# Patient Record
Sex: MALE | HISPANIC OR LATINO | URBAN - METROPOLITAN AREA
[De-identification: names, ages, dates, MRNs, and addresses within clinical notes are randomized per-mention and may not be internally consistent; named-entity substitution may affect disease eponyms.]

---

## 2017-12-14 ENCOUNTER — OFFICE VISIT (OUTPATIENT)
Dept: RETAIL CLINIC | Facility: CLINIC | Age: 32
End: 2017-12-14

## 2017-12-14 VITALS
HEART RATE: 95 BPM | BODY MASS INDEX: 28.46 KG/M2 | TEMPERATURE: 98.2 F | RESPIRATION RATE: 16 BRPM | WEIGHT: 170.8 LBS | OXYGEN SATURATION: 97 % | HEIGHT: 65 IN

## 2017-12-14 DIAGNOSIS — J06.9 VIRAL UPPER RESPIRATORY TRACT INFECTION: Primary | ICD-10-CM

## 2017-12-14 PROCEDURE — 99213 OFFICE O/P EST LOW 20 MIN: CPT | Performed by: NURSE PRACTITIONER

## 2017-12-14 RX ORDER — FLUTICASONE PROPIONATE 50 MCG
2 SPRAY, SUSPENSION (ML) NASAL NIGHTLY
Qty: 1 BOTTLE | Refills: 0 | Status: SHIPPED | OUTPATIENT
Start: 2017-12-14 | End: 2018-01-13

## 2017-12-14 RX ORDER — AMOXICILLIN 500 MG/1
500 CAPSULE ORAL 2 TIMES DAILY
Qty: 14 CAPSULE | Refills: 0 | Status: SHIPPED | OUTPATIENT
Start: 2017-12-14 | End: 2017-12-21

## 2017-12-14 NOTE — PATIENT INSTRUCTIONS
Fluticasone nasal spray  ¿Qué es javed medicamento?  La FLUTICASONA es un corticosteroide. Javed medicamento se utiliza para tratar los síntomas de alergias, tales enrique estornudos, picazón o enrojecimiento en los ojos, picazón o goteo de la nariz, o nariz tapada.  Javed medicamento puede ser utilizado para otros usos; si tiene alguna pregunta consulte con jauregui proveedor de atención médica o con jauregui farmacéutico.  MARCAS COMUNES: Flonase, Flonase Allergy Relief, Flonase Sensimist, Veramyst  ¿Qué le hay informar a mi profesional de la hussain antes de juan javed medicamento?  Necesita saber si usted presenta alguno de los siguientes problemas o situaciones:  -infección, enrique tuberculosis, herpes o infección micótica  -cirugía o lesión nasal o sinusal reciente  -si está tomando corticosteroides por vía oral  -nell reacción alérgica o inusual a la fluticasona, a steroides, a otros medicamentos, alimentos, colorantes o conservantes  -si está embarazada o buscando quedar embarazada  -si está amamantando a un bebé  ¿Cómo hay utilizar javed medicamento?  Javed medicamento es para utilizar en la nariz. Siga las instrucciones de la etiqueta del medicamento o producto. Javed medicamento actúa mejor cuando se utiliza intervalos regulares. No utilice jauregui medicamento con nell frecuencia mayor a la indicada. Asegúrese de que esté utilizando jauregui aerosol nasal correctamente. Después de 6 meses de uso diario sin receta, hable con jauregui médico o jauregui profesional de la hussain antes de usarlo por más tiempo. Consulte a jauregui médico o jauregui profesional de la hussain si tiene preguntas.  Hable con jauregui pediatra para informarse acerca del uso de javed medicamento en niños. Puede requerir atención especial. Javed medicamento ha sido utilizado para niños tan mejores enrique 2 años de edad. Después de dos meses de uso diario sin receta en un jocelyn, hable con jauregui pediatra antes de usarlo por más tiempo.  Sobredosis: Póngase en contacto inmediatamente con un centro toxicológico o  nell nicci de urgencia si usted sarahy que haya tomado demasiado medicamento.  ATENCIÓN: Javed medicamento es solo para usted. No comparta javed medicamento con nadie.  ¿Qué sucede si me olvido de nell dosis?  Si olvida nell dosis, tómela en cuanto se acuerde. Si es yosi la hora de jauregui próxima dosis, utilice sólo preston dosis y continúe con jauregui horario habitual. No use dosis dobles o adicionales.  ¿Qué puede interactuar con javed medicamento?  -quetoconazol  -metirapona  -algunos medicamentos para la infección por VIH  -vacunas  Puede ser que esta lista no menciona todas las posibles interacciones. Informe a jauregui profesional de la hussain de todos los productos a base de hierbas, medicamentos de venta arlette o suplementos nutritivos que esté tomando. Si usted fuma, consume bebidas alcohólicas o si utiliza drogas ilegales, indíqueselo también a jauregui profesional de la hussain. Algunas sustancias pueden interactuar con jauregui medicamento.  ¿A qué hay estar atento al usar javed medicamento?  Visite a juaregui médico o a jauregui profesional de la hussain para chequear jauregui evolución periódicamente. Puede experimentar nell mejoría en algunos de artie síntomas 12 horas después de aleida comenzado a utilizarlo. Informe a jauregui médico o a jauregui profesional de la hussain si artie síntomas no mejoran después de usar el medicamento lurdes 3 semanas.  Comuníquese de inmediato con jauregui médico si está en contacto con personas con sarampión o varicela mientras esté tomando javed medicamento.  ¿Qué efectos secundarios puedo tener al utilizar javed medicamento?  Efectos secundarios que debe informar a jauregui médico o a jauregui profesional de la hussain tan pronto enrique sea posible:  -reacciones alergicas, tales enrique erupción cutánea, picazón o urticarias, hinchazón de la ta, labios o lengua  -cambios en la visión  -síntomas gripales  -manchas dimitrios o llagas dentro de la boca o de la nariz  Efectos secundarios que, por lo general, no requieren atención médica (debe informarlos a jauregui médico o a jauregui  profesional de la hussain si persisten o si son molestos):  -ardor o irritación dentro de la nariz o de la garganta  -tos  -dolor de dominick  -sangrado por la nariz  -sabor u olor desagradable  Puede ser que esta lista no menciona todos los posibles efectos secundarios. Comuníquese a jauregui médico por asesoramiento médico sobre los efectos secundarios. Usted puede informar los efectos secundarios a la FDA por teléfono al [phone]-[phone].  ¿Dónde hay guardar mi medicina?  Manténgala fuera del alcance de los niños.  Guárdela a temperatura ambiente, entre 15 y 30 grados C (59 y 86 grados F). Deseche todo el medicamento que no haya utilizado, después de la fecha de vencimiento.  ATENCIÓN: Karla folleto es un resumen. Puede ser que no cubra toda la posible información. Si usted tiene preguntas acerca de esta medicina, consulte con jauregui médico, jauregui farmacéutico o jauregui profesional de la hussain.     © 2017, [employer]. (2016-02-09 00:00:00)    Otitis media - Adultos  (Otitis Media, Adult)  La otitis media es la irritación, dolor e inflamación (hinchazón) en el espacio que se encuentra detrás del tímpano (oído medio). La causa puede ser nell alergia o nell infección. Generalmente aparece junto con un resfrío.  CUIDADOS EN EL HOGAR  · Talmo los medicamentos según las indicaciones. Finalice la prescripción completa, aunque se sienta mejor.  · Solo tome medicamentos de venta arlette o recetados para el dolor, malestar o fiebre, enrique le indique el médico.  · Concurra a las consultas de control con el médico, según las indicaciones.  SOLICITE AYUDA SI:  · Tiene otitis media sólo en un oído o sangra por la nariz, o ambas cosas.  · Advierte un bulto en el martinez.  · No mejora luego de 3-5 días.  · Empeora en lugar de mejorar.  SOLICITE AYUDA DE INMEDIATO SI:   · Siente un dolor intenso y no lo alivian los medicamentos.  · Tiene irritación, hinchazón o dolor en el oído.  · Presenta rigidez en el martinez.  · No puede  nell parte de jauregui  katharine (parálisis).  · Nota que el hueso que se encuentra detrás de jauregui oreja le duele al tocarlo.  ASEGÚRESE DE QUE:   · Comprende estas instrucciones.  · Controlará jauregui afección.  · Recibirá ayuda de inmediato si no mejora o si empeora.     Esta información no tiene enrique fin reemplazar el consejo del médico. Asegúrese de hacerle al médico cualquier pregunta que tenga.     Document Released: 01/20/2012 Document Revised: 01/08/2016  Elsevier Interactive Patient Education ©2017 [employer].

## 2017-12-14 NOTE — PROGRESS NOTES
Subjective   Cuong Pro is a 32 y.o. male.     URI    This is a new problem. The current episode started in the past 7 days. The problem has been gradually worsening. There has been no fever. Associated symptoms include congestion, coughing, ear pain (right ear) and a plugged ear sensation. Pertinent negatives include no abdominal pain, chest pain, diarrhea, dysuria, headaches, joint pain, joint swelling, nausea, neck pain, rash, rhinorrhea, sinus pain, sneezing, sore throat, swollen glands, vomiting or wheezing. Treatments tried: robitussin. The treatment provided mild relief.        No current outpatient prescriptions on file prior to visit.     No current facility-administered medications on file prior to visit.        Allergies no known allergies    History reviewed. No pertinent past medical history.    History reviewed. No pertinent surgical history.    History reviewed. No pertinent family history.    Social History     Social History   • Marital status: Unknown     Spouse name: N/A   • Number of children: N/A   • Years of education: N/A     Occupational History   • Not on file.     Social History Main Topics   • Smoking status: Former Smoker     Packs/day: 1.50     Years: 8.00     Types: Cigarettes     Quit date: 12/14/2016   • Smokeless tobacco: Never Used   • Alcohol use No   • Drug use: No   • Sexual activity: Not on file     Other Topics Concern   • Not on file     Social History Narrative   • No narrative on file       Review of Systems   Constitutional: Negative for activity change and appetite change.   HENT: Positive for congestion and ear pain (right ear). Negative for rhinorrhea, sinus pain, sinus pressure, sneezing, sore throat and trouble swallowing.    Eyes: Positive for discharge, redness (right worse than left) and itching. Negative for pain.   Respiratory: Positive for cough. Negative for shortness of breath and wheezing.    Cardiovascular: Negative for chest pain.   Gastrointestinal:  "Negative for abdominal pain, diarrhea, nausea and vomiting.   Genitourinary: Negative for dysuria.   Musculoskeletal: Negative for joint pain and neck pain.   Skin: Negative for rash.   Allergic/Immunologic: Negative for environmental allergies.   Neurological: Negative for headaches.       Pulse 95  Temp 98.2 °F (36.8 °C) (Oral)   Resp 16  Ht 165.1 cm (65\")  Wt 77.5 kg (170 lb 12.8 oz)  SpO2 97%  BMI 28.42 kg/m2    Objective   Physical Exam   Constitutional: He is oriented to person, place, and time. He appears well-developed and well-nourished. He is cooperative. He appears ill.   HENT:   Head: Normocephalic and atraumatic.   Right Ear: External ear and ear canal normal. Tympanic membrane is erythematous.   Left Ear: Tympanic membrane, external ear and ear canal normal.   Ears:    Nose: Rhinorrhea present. Right sinus exhibits no maxillary sinus tenderness and no frontal sinus tenderness. Left sinus exhibits no maxillary sinus tenderness and no frontal sinus tenderness.   Mouth/Throat: Uvula is midline, oropharynx is clear and moist and mucous membranes are normal. Posterior oropharyngeal erythema: difficult to assess due to pt having tMJ and not being able to o(-en mouth for long periods.   Eyes: Lids are normal. Right eye exhibits discharge. Left eye exhibits discharge. Right conjunctiva is injected. Left conjunctiva is injected.   Cardiovascular: Normal rate and normal heart sounds.    Pulmonary/Chest: Effort normal and breath sounds normal.   Lymphadenopathy:     He has cervical adenopathy.   Neurological: He is alert and oriented to person, place, and time.   Skin: Skin is warm and dry. No rash noted.   Psychiatric: He has a normal mood and affect. His speech is normal and behavior is normal. Rapid and pressured: Maltese primary language.         Assessment/Plan   Diagnoses and all orders for this visit:    Viral upper respiratory tract infection  -     fluticasone (FLONASE) 50 MCG/ACT nasal spray; 2 " sprays into each nostril Every Night for 30 days. Administer 2 sprays in each nostril for each dose.  -     amoxicillin (AMOXIL) 500 MG capsule; Take 1 capsule by mouth 2 (Two) Times a Day for 7 days.        No results found for this or any previous visit.  Shinto  utilized for entire visit. Education given.   Follow up with PCP or go to the nearest emergency room if symptoms worsen or fail to improve.